# Patient Record
Sex: MALE | Race: BLACK OR AFRICAN AMERICAN | NOT HISPANIC OR LATINO | Employment: FULL TIME | URBAN - METROPOLITAN AREA
[De-identification: names, ages, dates, MRNs, and addresses within clinical notes are randomized per-mention and may not be internally consistent; named-entity substitution may affect disease eponyms.]

---

## 2024-01-04 ENCOUNTER — HOSPITAL ENCOUNTER (EMERGENCY)
Facility: HOSPITAL | Age: 59
Discharge: HOME | End: 2024-01-04
Attending: EMERGENCY MEDICINE
Payer: COMMERCIAL

## 2024-01-04 VITALS
BODY MASS INDEX: 32.18 KG/M2 | HEIGHT: 67 IN | RESPIRATION RATE: 16 BRPM | TEMPERATURE: 98 F | HEART RATE: 80 BPM | OXYGEN SATURATION: 100 % | SYSTOLIC BLOOD PRESSURE: 169 MMHG | DIASTOLIC BLOOD PRESSURE: 90 MMHG | WEIGHT: 205 LBS

## 2024-01-04 DIAGNOSIS — N47.2 PARAPHIMOSIS: Primary | ICD-10-CM

## 2024-01-04 DIAGNOSIS — N47.6 BALANOPOSTHITIS: ICD-10-CM

## 2024-01-04 DIAGNOSIS — N48.89 PENILE SWELLING: ICD-10-CM

## 2024-01-04 PROCEDURE — 99282 EMERGENCY DEPT VISIT SF MDM: CPT | Mod: ,,, | Performed by: STUDENT IN AN ORGANIZED HEALTH CARE EDUCATION/TRAINING PROGRAM

## 2024-01-04 PROCEDURE — 99283 EMERGENCY DEPT VISIT LOW MDM: CPT

## 2024-01-04 PROCEDURE — 25000003 PHARM REV CODE 250

## 2024-01-04 RX ORDER — BACITRACIN ZINC 500 UNIT/G
OINTMENT (GRAM) TOPICAL 2 TIMES DAILY
Qty: 30 G | Refills: 0 | Status: SHIPPED | OUTPATIENT
Start: 2024-01-04

## 2024-01-04 RX ORDER — NAPROXEN 500 MG/1
500 TABLET ORAL 2 TIMES DAILY PRN
Qty: 30 TABLET | Refills: 0 | Status: SHIPPED | OUTPATIENT
Start: 2024-01-04

## 2024-01-04 RX ORDER — BACITRACIN 500 [USP'U]/G
OINTMENT TOPICAL ONCE
Status: COMPLETED | OUTPATIENT
Start: 2024-01-04 | End: 2024-01-04

## 2024-01-04 RX ADMIN — BACITRACIN: 500 OINTMENT TOPICAL at 09:01

## 2024-01-05 NOTE — DISCHARGE INSTRUCTIONS

## 2024-01-05 NOTE — SUBJECTIVE & OBJECTIVE
"History reviewed. No pertinent past medical history.    History reviewed. No pertinent surgical history.    Review of patient's allergies indicates:  No Known Allergies    Family History    None         Tobacco Use    Smoking status: Every Day     Current packs/day: 0.50     Types: Cigarettes    Smokeless tobacco: Never   Substance and Sexual Activity    Alcohol use: Yes     Comment: socially    Drug use: Not Currently    Sexual activity: Not on file       Review of Systems   Genitourinary:  Positive for penile pain and penile swelling.       Objective:     Temp:  [98.1 °F (36.7 °C)] 98.1 °F (36.7 °C)  Pulse:  [97] 97  Resp:  [18] 18  SpO2:  [96 %] 96 %  BP: (174)/(90) 174/90  Weight: 93 kg (205 lb)  Body mass index is 32.11 kg/m².           Drains       None                    Physical Exam  Constitutional:       Appearance: Normal appearance.   HENT:      Head: Normocephalic and atraumatic.   Eyes:      Conjunctiva/sclera: Conjunctivae normal.   Cardiovascular:      Rate and Rhythm: Normal rate.   Pulmonary:      Effort: Pulmonary effort is normal.   Abdominal:      General: Abdomen is flat. There is no distension.      Tenderness: There is no abdominal tenderness.   Genitourinary:     Comments: Uncircumcised penis; phimotic ring with superficial skin tearing, proximal foreskin edema, penis currently soaking in saline in basin  Musculoskeletal:         General: Normal range of motion.   Skin:     General: Skin is warm and dry.   Neurological:      General: No focal deficit present.      Mental Status: He is alert and oriented to person, place, and time.   Psychiatric:         Mood and Affect: Mood normal.         Behavior: Behavior normal.         Thought Content: Thought content normal.         Judgment: Judgment normal.          Significant Labs:    BMP:  No results for input(s): "NA", "K", "CL", "CO2", "BUN", "CREATININE", "LABGLOM", "GLUCOSE", "CALCIUM" in the last 168 hours.    CBC:  No results for input(s): " ""WBC", "HGB", "HCT", "PLT" in the last 168 hours.    All pertinent labs results from the past 24 hours have been reviewed.    Significant Imaging:  All pertinent imaging results/findings from the past 24 hours have been reviewed.                  "

## 2024-01-05 NOTE — ED PROVIDER NOTES
Encounter Date: 1/4/2024       History     Chief Complaint   Patient presents with    Groin Swelling     57 yo male to triage for penis swelling x 1 month. Pt says she has been trying to treat it w/ OTC creams. Denies injury/trauma. VSS, NAD, AAOx4     58-year-old male with no past medical history presents to ED for penile swelling.  Patient states it has been going on for 1 month.  Patient complaining of a burning pain that comes and goes, he rates it a 2/10.  He has tried generic over-the-counter creams with no relief.  States touch makes it worse.  Patient states it originally started off as a red rash and then it became swollen.  Patient denies any pus discharge.  Patient denies any trauma.  Patient denies any changes in routine.  Patient denies any concerns for STIs, he has not been sexually active in over a year.  Patient denies being circumcised.  Patient has fever, sweats, chills, abdominal pain, nausea, vomiting, dysuria, frequency, urgency, testicular pain, testicular swelling, penile discharge, lower back pain, and headaches.      Review of patient's allergies indicates:  No Known Allergies  History reviewed. No pertinent past medical history.  History reviewed. No pertinent surgical history.  History reviewed. No pertinent family history.  Social History     Tobacco Use    Smoking status: Every Day     Current packs/day: 0.50     Types: Cigarettes    Smokeless tobacco: Never   Substance Use Topics    Alcohol use: Yes     Comment: socially    Drug use: Not Currently     Review of Systems   Constitutional:  Negative for chills, diaphoresis and fever.   Gastrointestinal:  Negative for abdominal pain, constipation, diarrhea, nausea and vomiting.   Genitourinary:  Positive for penile pain and penile swelling. Negative for decreased urine volume, difficulty urinating, dysuria, frequency, genital sores, hematuria, penile discharge, scrotal swelling, testicular pain and urgency.   Musculoskeletal:  Negative for  back pain.   Skin:  Positive for rash (penis).   Neurological:  Negative for headaches.       Physical Exam     Initial Vitals [01/04/24 1835]   BP Pulse Resp Temp SpO2   (!) 174/90 97 18 98.1 °F (36.7 °C) 96 %      MAP       --         Physical Exam    Nursing note and vitals reviewed.  Constitutional: He appears well-developed and well-nourished. He is not diaphoretic. No distress.   HENT:   Head: Normocephalic and atraumatic.   Right Ear: External ear normal.   Left Ear: External ear normal.   Nose: Nose normal.   Eyes: Pupils are equal, round, and reactive to light. Right eye exhibits no discharge. Left eye exhibits no discharge. No scleral icterus.   Neck:   Normal range of motion.  Pulmonary/Chest: No respiratory distress.   Abdominal: He exhibits no distension.   Genitourinary:    Testes normal.   Uncircumcised. Paraphimosis, penile erythema and penile tenderness present.    Genitourinary Comments: Flaking skin with fissures noted around retracted foreskin and penile head. See pictures below.     Nurse chaperoned     Musculoskeletal:         General: Normal range of motion.      Cervical back: Normal range of motion.     Neurological: He is alert and oriented to person, place, and time. He has normal strength. No sensory deficit. GCS eye subscore is 4. GCS verbal subscore is 5. GCS motor subscore is 6.   Skin: Skin is dry. Capillary refill takes less than 2 seconds.                   ED Course   Procedures  Labs Reviewed - No data to display         Imaging Results    None          Medications   bacitracin ointment ( Topical (Top) Given 1/4/24 2138)     Medical Decision Making  58-year-old male with no past medical history presents to ED for penile swelling.  Patient's chart and medical history reviewed.    Ddx:  UTI  Gonorrhea  Chlamydia  Balanoposthitis   Paraphimosis  Phimosis  HSV    Patient's vitals reviewed.  Afebrile, no respiratory distress, and nontoxic-appearing in the ED. Patient had penile  "swelling, erythema, paraphimosis with flaking skin and fissures noted around retracted foreskin and penile head.  Patient states she has normal sensation and urinating normal.  Patient denied pain medication.  Discussed his case with Dr. Redman who came and saw the patient as well.  We can not manually tract foreskin.  Consulted on-call urologist Dr. Jitendra Weems; we will soak patient's penis with normal saline and apply LETS if needed. Foreskin retracted after soak. Dr. Weems came and saw the patient. He suggests to "apply some bacitracin ointment to the skin tear, but don't pull the foreskin behind the glans. I'd discharge him with some too, plus some pain meds. I'll put in a referral for him to be seen on the [facility] for a circumcision".  Patient agrees with this plan.  Patient will be sent home with naproxen and bacitracin ointment.  Patient will follow-up with urology. Patient agrees with this plan. Discussed with him strict return precautions, he verbalized understanding. Patient is stable for discharge.       Risk  OTC drugs.  Prescription drug management.                                      Clinical Impression:  Final diagnoses:  [N48.89] Penile swelling  [N47.2] Paraphimosis (Primary)  [N47.6] Balanoposthitis          ED Disposition Condition    Discharge Stable          ED Prescriptions       Medication Sig Dispense Start Date End Date Auth. Provider    bacitracin 500 unit/gram Oint Apply topically 2 (two) times daily. Apply to penile skin tears 30 g 1/4/2024 -- Holdsworth, Alayna, PA-C    naproxen (NAPROSYN) 500 MG tablet Take 1 tablet (500 mg total) by mouth 2 (two) times daily as needed (Pain). 30 tablet 1/4/2024 -- Holdsworth, Alayna, PA-C          Follow-up Information       Follow up With Specialties Details Why Contact Info    Jitendra Weems MD Urology   [address] [facility]  [address] [place], [place] [id number] [phone]               Holdsworth, Alayna, PA-C  01/04/24 6741    "

## 2024-01-05 NOTE — HPI
Chinmay Hughes is a 59 yo M with PMH of prediabetes who presents to [facility] ED with penile pain and swelling x 3 days. His foreskin was retracted on Monday and has not been able to be reduced since. He has had what sounds like balanitis for 1 month and has been using topical cream (what sounds like hydrocortisone) for this. He is able to urinate. ED was unable to reduce foreskin and Urology was consulted.

## 2024-01-05 NOTE — CONSULTS
[facility] - Emergency Dept  Urology  Consult Note    Patient Name: Chinmay Hughes  MRN: [id number]  Admission Date: 1/4/2024  Hospital Length of Stay: 0   Code Status: No Order   Attending Provider: Siva Redman MD   Consulting Provider: Jitendra Weems MD  Primary Care Physician: No primary care provider on file.  Principal Problem:<principal problem not specified>    Inpatient consult to Urology  Consult performed by: Jitendra Weems MD  Consult ordered by: Siva Redman MD          Subjective:     HPI:  Chinmay Hughes is a 57 yo M with PMH of prediabetes who presents to [facility] ED with penile pain and swelling x 3 days. His foreskin was retracted on Monday and has not been able to be reduced since. He has had what sounds like balanitis for 1 month and has been using topical cream (what sounds like hydrocortisone) for this. He is able to urinate. ED was unable to reduce foreskin and Urology was consulted.    History reviewed. No pertinent past medical history.    History reviewed. No pertinent surgical history.    Review of patient's allergies indicates:  No Known Allergies    Family History    None         Tobacco Use    Smoking status: Every Day     Current packs/day: 0.50     Types: Cigarettes    Smokeless tobacco: Never   Substance and Sexual Activity    Alcohol use: Yes     Comment: socially    Drug use: Not Currently    Sexual activity: Not on file       Review of Systems   Genitourinary:  Positive for penile pain and penile swelling.       Objective:     Temp:  [98.1 °F (36.7 °C)] 98.1 °F (36.7 °C)  Pulse:  [97] 97  Resp:  [18] 18  SpO2:  [96 %] 96 %  BP: (174)/(90) 174/90  Weight: 93 kg (205 lb)  Body mass index is 32.11 kg/m².           Drains       None                    Physical Exam  Constitutional:       Appearance: Normal appearance.   HENT:      Head: Normocephalic and atraumatic.   Eyes:      Conjunctiva/sclera: Conjunctivae normal.   Cardiovascular:      Rate and Rhythm:  "Normal rate.   Pulmonary:      Effort: Pulmonary effort is normal.   Abdominal:      General: Abdomen is flat. There is no distension.      Tenderness: There is no abdominal tenderness.   Genitourinary:     Comments: Uncircumcised penis; phimotic ring with superficial skin tearing, proximal foreskin edema, penis currently soaking in saline in basin  Musculoskeletal:         General: Normal range of motion.   Skin:     General: Skin is warm and dry.   Neurological:      General: No focal deficit present.      Mental Status: He is alert and oriented to person, place, and time.   Psychiatric:         Mood and Affect: Mood normal.         Behavior: Behavior normal.         Thought Content: Thought content normal.         Judgment: Judgment normal.          Significant Labs:    BMP:  No results for input(s): "NA", "K", "CL", "CO2", "BUN", "CREATININE", "LABGLOM", "GLUCOSE", "CALCIUM" in the last 168 hours.    CBC:  No results for input(s): "WBC", "HGB", "HCT", "PLT" in the last 168 hours.    All pertinent labs results from the past 24 hours have been reviewed.    Significant Imaging:  All pertinent imaging results/findings from the past 24 hours have been reviewed.                    Assessment and Plan:     Paraphimosis  - foreskin manually reduced at bedside  - recommend discharge with analgesics, bacitracin ointment for superficial tears in foreskin  - ambulatory referral to Urology for circumcision; patient prefers to be seen on [facility]  - patient instructed to not retract the foreskin behind the glans to prevent recurrence of paraphimosis        VTE Risk Mitigation (From admission, onward)      None            Thank you for your consult. I will sign off. Please contact us if you have any additional questions.    Jitendra Weems MD  Urology  [facility] - Emergency Dept  "

## 2024-01-05 NOTE — ASSESSMENT & PLAN NOTE
- foreskin manually reduced at bedside  - recommend discharge with analgesics, bacitracin ointment for superficial tears in foreskin  - ambulatory referral to Urology for circumcision; patient prefers to be seen on [facility]  - patient instructed to not retract the foreskin behind the glans to prevent recurrence of paraphimosis

## 2024-01-16 ENCOUNTER — TELEPHONE (OUTPATIENT)
Dept: UROLOGY | Facility: CLINIC | Age: 59
End: 2024-01-16
Payer: COMMERCIAL

## 2024-08-29 ENCOUNTER — NURSE TRIAGE (OUTPATIENT)
Dept: ADMINISTRATIVE | Facility: CLINIC | Age: 59
End: 2024-08-29
Payer: COMMERCIAL

## 2024-08-29 ENCOUNTER — HOSPITAL ENCOUNTER (EMERGENCY)
Facility: HOSPITAL | Age: 59
Discharge: HOME | End: 2024-08-29
Payer: COMMERCIAL

## 2024-08-29 VITALS
HEIGHT: 67 IN | RESPIRATION RATE: 20 BRPM | OXYGEN SATURATION: 96 % | SYSTOLIC BLOOD PRESSURE: 191 MMHG | TEMPERATURE: 98 F | WEIGHT: 190 LBS | BODY MASS INDEX: 29.82 KG/M2 | HEART RATE: 97 BPM | DIASTOLIC BLOOD PRESSURE: 103 MMHG

## 2024-08-29 DIAGNOSIS — T59.811A SMOKE INHALATION: ICD-10-CM

## 2024-08-29 DIAGNOSIS — R05.1 ACUTE COUGH: Primary | ICD-10-CM

## 2024-08-29 PROCEDURE — 99284 EMERGENCY DEPT VISIT MOD MDM: CPT | Mod: 25,ER

## 2024-08-29 PROCEDURE — 25000003 PHARM REV CODE 250: Mod: ER | Performed by: PHYSICIAN ASSISTANT

## 2024-08-29 RX ORDER — BENZONATATE 100 MG/1
200 CAPSULE ORAL
Status: COMPLETED | OUTPATIENT
Start: 2024-08-29 | End: 2024-08-29

## 2024-08-29 RX ORDER — BENZONATATE 100 MG/1
100 CAPSULE ORAL 2 TIMES DAILY PRN
Qty: 10 CAPSULE | Refills: 0 | Status: SHIPPED | OUTPATIENT
Start: 2024-08-29

## 2024-08-29 RX ORDER — PROMETHAZINE HYDROCHLORIDE AND DEXTROMETHORPHAN HYDROBROMIDE 6.25; 15 MG/5ML; MG/5ML
5 SYRUP ORAL EVERY 8 HOURS PRN
Qty: 118 ML | Refills: 0 | Status: SHIPPED | OUTPATIENT
Start: 2024-08-29 | End: 2024-09-03

## 2024-08-29 RX ADMIN — BENZONATATE 200 MG: 100 CAPSULE ORAL at 08:08

## 2024-08-30 NOTE — DISCHARGE INSTRUCTIONS
Take prescribed medication as directed.  Close follow-up with your PCP.  Return to ED with any worsening of symptoms or condition.

## 2024-08-30 NOTE — TELEPHONE ENCOUNTER
"Pt mom calling, very upset with Rxs given at ER visit. States "reading this medicine, his lungs." Mom states she worked at a hospital in [place], "took pharmacology, these meds will paralyze his lungs." Asked to speak to patient, pt states no issues at this time. Advised pt to call back for any further questions or concerns.    Reason for Disposition   Difficult caller responded to triager counseling    Protocols used: Difficult Call-A-    "
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
